# Patient Record
Sex: MALE | Race: WHITE
[De-identification: names, ages, dates, MRNs, and addresses within clinical notes are randomized per-mention and may not be internally consistent; named-entity substitution may affect disease eponyms.]

---

## 2022-07-21 ENCOUNTER — HOSPITAL ENCOUNTER (EMERGENCY)
Dept: HOSPITAL 12 - ER | Age: 52
LOS: 1 days | Discharge: HOME | End: 2022-07-22
Payer: SELF-PAY

## 2022-07-21 VITALS — BODY MASS INDEX: 32.76 KG/M2 | WEIGHT: 178 LBS | HEIGHT: 62 IN

## 2022-07-21 DIAGNOSIS — K91.841: Primary | ICD-10-CM
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
## 2022-07-21 DIAGNOSIS — E11.65: ICD-10-CM
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
## 2022-07-21 DIAGNOSIS — Z79.4: ICD-10-CM

## 2022-07-21 PROCEDURE — A4663 DIALYSIS BLOOD PRESSURE CUFF: HCPCS

## 2022-07-21 PROCEDURE — 36415 COLL VENOUS BLD VENIPUNCTURE: CPT

## 2022-07-21 PROCEDURE — 41899 UNLISTED PX DENTALVLR STRUX: CPT

## 2022-07-21 PROCEDURE — 85025 COMPLETE CBC W/AUTO DIFF WBC: CPT

## 2022-07-21 PROCEDURE — 99284 EMERGENCY DEPT VISIT MOD MDM: CPT

## 2022-07-21 PROCEDURE — 80048 BASIC METABOLIC PNL TOTAL CA: CPT

## 2022-07-21 PROCEDURE — 85610 PROTHROMBIN TIME: CPT

## 2022-07-21 NOTE — NUR
Lidocaine with epinephrine 1% pulled out of the [facility] at [address]. Dr. Duran forgot to put 
in the order for the medication.

## 2022-07-22 VITALS — DIASTOLIC BLOOD PRESSURE: 78 MMHG | SYSTOLIC BLOOD PRESSURE: 115 MMHG

## 2022-07-22 LAB
BUN SERPL-MCNC: 14 MG/DL (ref 7–18)
CHLORIDE SERPL-SCNC: 102 MMOL/L (ref 98–107)
CO2 SERPL-SCNC: 28 MMOL/L (ref 21–32)
CREAT SERPL-MCNC: 1 MG/DL (ref 0.6–1.3)
GLUCOSE SERPL-MCNC: 365 MG/DL (ref 74–106)
HCT VFR BLD AUTO: 39.6 % (ref 36.7–47.1)
MCH RBC QN AUTO: 29.3 UUG (ref 23.8–33.4)
MCV RBC AUTO: 85.1 FL (ref 73–96.2)
PLATELET # BLD AUTO: 320 K/UL (ref 152–348)
POTASSIUM SERPL-SCNC: 4.6 MMOL/L (ref 3.5–5.1)